# Patient Record
Sex: FEMALE | Race: OTHER | Employment: STUDENT | URBAN - METROPOLITAN AREA
[De-identification: names, ages, dates, MRNs, and addresses within clinical notes are randomized per-mention and may not be internally consistent; named-entity substitution may affect disease eponyms.]

---

## 2017-05-22 ENCOUNTER — APPOINTMENT (OUTPATIENT)
Dept: OCCUPATIONAL MEDICINE | Age: 22
End: 2017-05-22
Attending: EMERGENCY MEDICINE

## 2017-07-24 ENCOUNTER — LAB ENCOUNTER (OUTPATIENT)
Dept: LAB | Age: 22
End: 2017-07-24
Attending: FAMILY MEDICINE
Payer: COMMERCIAL

## 2017-07-24 ENCOUNTER — HOSPITAL ENCOUNTER (OUTPATIENT)
Dept: GENERAL RADIOLOGY | Age: 22
Discharge: HOME | End: 2017-07-24
Attending: FAMILY MEDICINE
Payer: COMMERCIAL

## 2017-07-24 ENCOUNTER — OFFICE VISIT (OUTPATIENT)
Dept: FAMILY MEDICINE CLINIC | Facility: CLINIC | Age: 22
End: 2017-07-24

## 2017-07-24 VITALS
BODY MASS INDEX: 40.33 KG/M2 | DIASTOLIC BLOOD PRESSURE: 68 MMHG | HEIGHT: 65.5 IN | HEART RATE: 77 BPM | WEIGHT: 245 LBS | SYSTOLIC BLOOD PRESSURE: 111 MMHG | TEMPERATURE: 100 F

## 2017-07-24 DIAGNOSIS — R10.33 PERIUMBILICAL ABDOMINAL PAIN: ICD-10-CM

## 2017-07-24 DIAGNOSIS — R10.33 PERIUMBILICAL ABDOMINAL PAIN: Primary | ICD-10-CM

## 2017-07-24 LAB
ALBUMIN SERPL BCP-MCNC: 3.8 G/DL (ref 3.5–4.8)
ALBUMIN/GLOB SERPL: 1.1 {RATIO} (ref 1–2)
ALP SERPL-CCNC: 56 U/L (ref 32–100)
ALT SERPL-CCNC: 16 U/L (ref 14–54)
ANION GAP SERPL CALC-SCNC: 6 MMOL/L (ref 0–18)
AST SERPL-CCNC: 18 U/L (ref 15–41)
BASOPHILS # BLD: 0.1 K/UL (ref 0–0.2)
BASOPHILS NFR BLD: 1 %
BILIRUB SERPL-MCNC: 0.4 MG/DL (ref 0.3–1.2)
BUN SERPL-MCNC: 6 MG/DL (ref 8–20)
BUN/CREAT SERPL: 17.1 (ref 10–20)
CALCIUM SERPL-MCNC: 9.2 MG/DL (ref 8.5–10.5)
CHLORIDE SERPL-SCNC: 105 MMOL/L (ref 95–110)
CO2 SERPL-SCNC: 26 MMOL/L (ref 22–32)
CREAT SERPL-MCNC: 0.35 MG/DL (ref 0.5–1.5)
EOSINOPHIL # BLD: 0.2 K/UL (ref 0–0.7)
EOSINOPHIL NFR BLD: 2 %
ERYTHROCYTE [DISTWIDTH] IN BLOOD BY AUTOMATED COUNT: 13 % (ref 11–15)
ERYTHROCYTE [SEDIMENTATION RATE] IN BLOOD: 15 MM/HR (ref 0–20)
GLOBULIN PLAS-MCNC: 3.4 G/DL (ref 2.5–3.7)
GLUCOSE SERPL-MCNC: 90 MG/DL (ref 70–99)
HCT VFR BLD AUTO: 36.1 % (ref 35–48)
HGB BLD-MCNC: 12.5 G/DL (ref 12–16)
LYMPHOCYTES # BLD: 2.4 K/UL (ref 1–4)
LYMPHOCYTES NFR BLD: 24 %
MCH RBC QN AUTO: 30.5 PG (ref 27–32)
MCHC RBC AUTO-ENTMCNC: 34.6 G/DL (ref 32–37)
MCV RBC AUTO: 88.1 FL (ref 80–100)
MONOCYTES # BLD: 0.7 K/UL (ref 0–1)
MONOCYTES NFR BLD: 7 %
NEUTROPHILS # BLD AUTO: 6.6 K/UL (ref 1.8–7.7)
NEUTROPHILS NFR BLD: 66 %
OSMOLALITY UR CALC.SUM OF ELEC: 281 MOSM/KG (ref 275–295)
PLATELET # BLD AUTO: 313 K/UL (ref 140–400)
PMV BLD AUTO: 9.6 FL (ref 7.4–10.3)
POTASSIUM SERPL-SCNC: 3.6 MMOL/L (ref 3.3–5.1)
PROT SERPL-MCNC: 7.2 G/DL (ref 5.9–8.4)
RBC # BLD AUTO: 4.1 M/UL (ref 3.7–5.4)
SODIUM SERPL-SCNC: 137 MMOL/L (ref 136–144)
WBC # BLD AUTO: 9.9 K/UL (ref 4–11)

## 2017-07-24 PROCEDURE — 99212 OFFICE O/P EST SF 10 MIN: CPT | Performed by: FAMILY MEDICINE

## 2017-07-24 PROCEDURE — 74000 XR ABDOMEN (KUB) (1 AP VIEW)  (CPT=74000): CPT | Performed by: FAMILY MEDICINE

## 2017-07-24 PROCEDURE — 85652 RBC SED RATE AUTOMATED: CPT

## 2017-07-24 PROCEDURE — 80053 COMPREHEN METABOLIC PANEL: CPT

## 2017-07-24 PROCEDURE — 36415 COLL VENOUS BLD VENIPUNCTURE: CPT

## 2017-07-24 PROCEDURE — 85025 COMPLETE CBC W/AUTO DIFF WBC: CPT

## 2017-07-24 PROCEDURE — 99203 OFFICE O/P NEW LOW 30 MIN: CPT | Performed by: FAMILY MEDICINE

## 2017-07-24 NOTE — PROGRESS NOTES
Patient ID: Ebony Soriano is a 25year old female. HPI  Patient presents with:  Abdominal Pain    She goes to 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    She states 3 weeks a Mucous membranes are normal.   Neck: Normal range of motion. Neck supple. No thyromegaly   Cardiovascular: Normal rate, regular rhythm and normal heart sounds. Pulmonary/Chest: Effort normal and breath sounds normal. No respiratory distress.    Abdominal

## 2017-07-26 ENCOUNTER — TELEPHONE (OUTPATIENT)
Dept: FAMILY MEDICINE CLINIC | Facility: CLINIC | Age: 22
End: 2017-07-26

## 2017-07-26 NOTE — TELEPHONE ENCOUNTER
----- Message from Zara Alberto DO sent at 7/24/2017  1:18 PM CDT -----  X-ray of the abdomen is normal.  IUD is in place.

## 2017-08-08 ENCOUNTER — TELEPHONE (OUTPATIENT)
Dept: FAMILY MEDICINE CLINIC | Facility: CLINIC | Age: 22
End: 2017-08-08

## 2017-08-08 ENCOUNTER — OFFICE VISIT (OUTPATIENT)
Dept: FAMILY MEDICINE CLINIC | Facility: CLINIC | Age: 22
End: 2017-08-08

## 2017-08-08 VITALS
BODY MASS INDEX: 41.15 KG/M2 | WEIGHT: 250 LBS | DIASTOLIC BLOOD PRESSURE: 72 MMHG | SYSTOLIC BLOOD PRESSURE: 119 MMHG | TEMPERATURE: 99 F | HEIGHT: 65.5 IN | HEART RATE: 83 BPM

## 2017-08-08 DIAGNOSIS — L25.5 CONTACT DERMATITIS DUE TO PLANT: Primary | ICD-10-CM

## 2017-08-08 PROCEDURE — 99212 OFFICE O/P EST SF 10 MIN: CPT | Performed by: FAMILY MEDICINE

## 2017-08-08 PROCEDURE — 99213 OFFICE O/P EST LOW 20 MIN: CPT | Performed by: FAMILY MEDICINE

## 2017-08-08 RX ORDER — PREDNISONE 20 MG/1
TABLET ORAL
Qty: 18 TABLET | Refills: 0 | Status: SHIPPED | OUTPATIENT
Start: 2017-08-08 | End: 2018-06-07

## 2017-08-08 RX ORDER — HYDROXYZINE HYDROCHLORIDE 10 MG/1
10 TABLET, FILM COATED ORAL EVERY 8 HOURS PRN
Qty: 30 TABLET | Refills: 0 | Status: SHIPPED | OUTPATIENT
Start: 2017-08-08 | End: 2017-08-18

## 2017-08-08 NOTE — TELEPHONE ENCOUNTER
Actions Requested: Dr. Sadi Hernandez, are you able to add on patient today?  After 2:30pm?  Problem: RT hand blister like rash  Onset and Timing: Thursday  Associated Symptoms: red, swelling started today  Aggravating by: sensitive to the touch  Alleviated by:   Deny Ambrocio

## 2017-08-08 NOTE — TELEPHONE ENCOUNTER
Contacted Dr. Brooke Lopez via [employer] . Approved to add patient on asap. Patient will come right after work (2:30pm) not far from [facility] clinic.   appt made for 2:50pm

## 2017-08-08 NOTE — PROGRESS NOTES
Patient ID: Sergio Edwards is a 25year old female. Actions Requested: Dr. Rose Nelson, are you able to add on patient today?  After 2:30pm?  Problem: RT hand blister like rash  Onset and Timing: Thursday  Associated Symptoms: red, swelling started today  Brandie Maldonado by mouth at same time for 3 days then 1 tab each day for 3 days. -     HydrOXYzine HCl 10 MG Oral Tab; Take 1 tablet (10 mg total) by mouth every 8 (eight) hours as needed for Itching (10). This medicine can make you tired.   -     triamcinolone acetonide

## 2018-06-07 ENCOUNTER — LAB ENCOUNTER (OUTPATIENT)
Dept: LAB | Age: 23
End: 2018-06-07
Attending: NURSE PRACTITIONER
Payer: COMMERCIAL

## 2018-06-07 ENCOUNTER — OFFICE VISIT (OUTPATIENT)
Dept: FAMILY MEDICINE CLINIC | Facility: CLINIC | Age: 23
End: 2018-06-07

## 2018-06-07 VITALS
WEIGHT: 247 LBS | BODY MASS INDEX: 40.66 KG/M2 | SYSTOLIC BLOOD PRESSURE: 131 MMHG | HEIGHT: 65.5 IN | HEART RATE: 90 BPM | DIASTOLIC BLOOD PRESSURE: 85 MMHG

## 2018-06-07 DIAGNOSIS — Z20.2 EXPOSURE TO SEXUALLY TRANSMITTED DISEASE (STD): Primary | ICD-10-CM

## 2018-06-07 DIAGNOSIS — Z20.2 EXPOSURE TO SEXUALLY TRANSMITTED DISEASE (STD): ICD-10-CM

## 2018-06-07 PROCEDURE — 86780 TREPONEMA PALLIDUM: CPT

## 2018-06-07 PROCEDURE — 86704 HEP B CORE ANTIBODY TOTAL: CPT

## 2018-06-07 PROCEDURE — 86706 HEP B SURFACE ANTIBODY: CPT

## 2018-06-07 PROCEDURE — 80500 HEPATITIS A B + C PROFILE: CPT

## 2018-06-07 PROCEDURE — 86803 HEPATITIS C AB TEST: CPT

## 2018-06-07 PROCEDURE — 87491 CHLMYD TRACH DNA AMP PROBE: CPT

## 2018-06-07 PROCEDURE — 86709 HEPATITIS A IGM ANTIBODY: CPT

## 2018-06-07 PROCEDURE — 87389 HIV-1 AG W/HIV-1&-2 AB AG IA: CPT

## 2018-06-07 PROCEDURE — 86694 HERPES SIMPLEX NES ANTBDY: CPT

## 2018-06-07 PROCEDURE — 99213 OFFICE O/P EST LOW 20 MIN: CPT | Performed by: NURSE PRACTITIONER

## 2018-06-07 PROCEDURE — 87591 N.GONORRHOEAE DNA AMP PROB: CPT

## 2018-06-07 PROCEDURE — 36415 COLL VENOUS BLD VENIPUNCTURE: CPT

## 2018-06-07 PROCEDURE — 86695 HERPES SIMPLEX TYPE 1 TEST: CPT

## 2018-06-07 PROCEDURE — 87340 HEPATITIS B SURFACE AG IA: CPT

## 2018-06-07 PROCEDURE — 86708 HEPATITIS A ANTIBODY: CPT

## 2018-06-07 PROCEDURE — 86696 HERPES SIMPLEX TYPE 2 TEST: CPT

## 2018-06-07 RX ORDER — AZITHROMYCIN 500 MG/1
TABLET, FILM COATED ORAL
Qty: 2 TABLET | Refills: 0 | Status: SHIPPED | OUTPATIENT
Start: 2018-06-07 | End: 2021-01-25

## 2018-06-07 NOTE — PATIENT INSTRUCTIONS
What Are Sexually Transmitted Diseases (STDs)? A sexually transmitted disease (STD) is a disease that is spread during sex.  (An STD can also be called STI for sexually transmitted infection.) You can become infected with an STD if you have sex with some The only sure way to know if you have an STD is to get checked by a healthcare provider. If you notice a change in how your body looks or feels, have it checked out. But keep in mind, STDs don’t always show symptoms.  So if you’re at risk of STDs, get check

## 2018-06-07 NOTE — PROGRESS NOTES
HPI  Pt here for std testing. Partner from 2 months tested positive for chlamydia. Has new partner. Pt has female partners. Never had a pap-req to follow up at her gyne for pap. Would like all std testing done.     Denies any s/s assoc with chlamydia in and regular rhythm. Pulmonary/Chest: Effort normal. No respiratory distress. Abdominal: Soft. She exhibits no distension. There is no tenderness. Musculoskeletal: Normal range of motion. Lymphadenopathy:     She has no cervical adenopathy.    Neuro

## 2018-06-11 ENCOUNTER — TELEPHONE (OUTPATIENT)
Dept: FAMILY MEDICINE CLINIC | Facility: CLINIC | Age: 23
End: 2018-06-11

## 2018-06-12 ENCOUNTER — TELEPHONE (OUTPATIENT)
Dept: OTHER | Age: 23
End: 2018-06-12

## 2018-06-12 NOTE — TELEPHONE ENCOUNTER
----- Message from RICHARD Herrera FNP-C sent at 6/11/2018  8:06 PM CDT -----  Your chlamydia and gonorrhea screening is negative. Your syphillis screening is negative.   Your HIV is negative  Your hepatitis screen shows immunity against Hepatitis

## 2019-10-17 NOTE — TELEPHONE ENCOUNTER
Patient calling - verified patient's name and  - informed patient of doctor's note - patient verbalized understanding and had no further questions. Universal Safety Interventions

## 2020-02-18 ENCOUNTER — TELEPHONE (OUTPATIENT)
Dept: MATERNAL FETAL MEDICINE | Age: 25
End: 2020-02-18

## 2020-02-18 DIAGNOSIS — O36.5990 IUGR (INTRAUTERINE GROWTH RESTRICTION) AFFECTING CARE OF MOTHER: Primary | ICD-10-CM

## 2020-02-19 ENCOUNTER — DOCUMENTATION (OUTPATIENT)
Dept: MATERNAL FETAL MEDICINE | Age: 25
End: 2020-02-19

## 2020-02-24 ENCOUNTER — OFFICE VISIT (OUTPATIENT)
Dept: MATERNAL FETAL MEDICINE | Age: 25
End: 2020-02-24

## 2020-02-24 DIAGNOSIS — O36.5930 INTRAUTERINE GROWTH RESTRICTION (IUGR) AFFECTING CARE OF MOTHER, THIRD TRIMESTER, SINGLE OR UNSPECIFIED FETUS: ICD-10-CM

## 2020-02-24 DIAGNOSIS — O36.5990 IUGR (INTRAUTERINE GROWTH RESTRICTION) AFFECTING CARE OF MOTHER: Primary | ICD-10-CM

## 2020-02-24 DIAGNOSIS — Z3A.37 37 WEEKS GESTATION OF PREGNANCY: ICD-10-CM

## 2020-02-24 DIAGNOSIS — Z3A.37 37 WEEKS GESTATION OF PREGNANCY: Primary | ICD-10-CM

## 2020-02-24 DIAGNOSIS — Z36.89 ENCOUNTER FOR ULTRASOUND TO ASSESS INTERVAL GROWTH OF FETUS: ICD-10-CM

## 2020-02-24 LAB
APPEARANCE, POC: CLEAR
BILIRUBIN, POC: NEGATIVE
COLOR, POC: YELLOW
GLUCOSE UR-MCNC: NEGATIVE MG/DL
KETONES, POC: NEGATIVE
NITRITE, POC: NEGATIVE
OCCULT BLOOD, POC: NEGATIVE
PH UR: 7.5 [PH] (ref 5–7)
PROT UR-MCNC: NEGATIVE G/DL
SP GR UR: 1.02 (ref 1–1.03)
UROBILINOGEN UR-MCNC: 1 MG/DL (ref 0–1)
WBC (LEUKOCYTE) ESTERASE, POC: NEGATIVE

## 2020-02-24 PROCEDURE — 81002 URINALYSIS NONAUTO W/O SCOPE: CPT | Performed by: OBSTETRICS & GYNECOLOGY

## 2020-02-24 PROCEDURE — 99243 OFF/OP CNSLTJ NEW/EST LOW 30: CPT | Performed by: OBSTETRICS & GYNECOLOGY

## 2020-02-24 PROCEDURE — 76816 OB US FOLLOW-UP PER FETUS: CPT | Performed by: OBSTETRICS & GYNECOLOGY

## 2020-02-24 PROCEDURE — 76820 UMBILICAL ARTERY ECHO: CPT | Performed by: OBSTETRICS & GYNECOLOGY

## 2020-02-24 SDOH — SOCIAL STABILITY: SOCIAL INSECURITY: WITHIN THE LAST YEAR, HAVE YOU BEEN HUMILIATED OR EMOTIONALLY ABUSED IN OTHER WAYS BY YOUR PARTNER OR EX-PARTNER?: NO

## 2020-02-24 SDOH — SOCIAL STABILITY: SOCIAL INSECURITY
WITHIN THE LAST YEAR, HAVE TO BEEN RAPED OR FORCED TO HAVE ANY KIND OF SEXUAL ACTIVITY BY YOUR PARTNER OR EX-PARTNER?: NO

## 2020-02-24 SDOH — HEALTH STABILITY: PHYSICAL HEALTH: ON AVERAGE, HOW MANY DAYS PER WEEK DO YOU ENGAGE IN MODERATE TO STRENUOUS EXERCISE (LIKE A BRISK WALK)?: 0 DAYS

## 2020-02-24 SDOH — HEALTH STABILITY: MENTAL HEALTH
STRESS IS WHEN SOMEONE FEELS TENSE, NERVOUS, ANXIOUS, OR CAN'T SLEEP AT NIGHT BECAUSE THEIR MIND IS TROUBLED. HOW STRESSED ARE YOU?: NOT AT ALL

## 2020-02-24 SDOH — HEALTH STABILITY: PHYSICAL HEALTH: ON AVERAGE, HOW MANY MINUTES DO YOU ENGAGE IN EXERCISE AT THIS LEVEL?: 0 MIN

## 2020-02-24 SDOH — ECONOMIC STABILITY: TRANSPORTATION INSECURITY
IN THE PAST 12 MONTHS, HAS LACK OF TRANSPORTATION KEPT YOU FROM MEETINGS, WORK, OR FROM GETTING THINGS NEEDED FOR DAILY LIVING?: NO

## 2020-02-24 SDOH — ECONOMIC STABILITY: TRANSPORTATION INSECURITY
IN THE PAST 12 MONTHS, HAS THE LACK OF TRANSPORTATION KEPT YOU FROM MEDICAL APPOINTMENTS OR FROM GETTING MEDICATIONS?: NO

## 2020-02-24 SDOH — SOCIAL STABILITY: SOCIAL INSECURITY: WITHIN THE LAST YEAR, HAVE YOU BEEN AFRAID OF YOUR PARTNER OR EX-PARTNER?: NO

## 2020-02-24 SDOH — SOCIAL STABILITY: SOCIAL INSECURITY
WITHIN THE LAST YEAR, HAVE YOU BEEN KICKED, HIT, SLAPPED, OR OTHERWISE PHYSICALLY HURT BY YOUR PARTNER OR EX-PARTNER?: NO

## 2020-02-24 ASSESSMENT — PAIN SCALES - GENERAL: PAINLEVEL: 0

## 2020-03-05 ENCOUNTER — HOSPITAL (OUTPATIENT)
Dept: OTHER | Age: 25
End: 2020-03-05
Attending: OBSTETRICS & GYNECOLOGY

## 2020-03-05 LAB
ABO + RH BLD: NORMAL
ABO + RH BLD: NORMAL
ANALYZER ANC (IANC): ABNORMAL
BASE DEFICIT BLDCOA-SCNC: 5 MMOL/L
BASE DEFICIT BLDCOV-SCNC: 5 MMOL/L
BASE EXCESS BLDCOA CALC-SCNC: NORMAL MMOL/L
BASE EXCESS-RC: ABNORMAL
BLD GP AB SCN SERPL QL: NEGATIVE
CROSSMATCH EXPIRE: NORMAL
ERYTHROCYTE [DISTWIDTH] IN BLOOD: 15.5 % (ref 11–15)
HCO3 BLDCOA-SCNC: 22 MMOL/L (ref 21–28)
HCO3 BLDCOV-SCNC: 21 MMOL/L (ref 22–29)
HCT VFR BLD CALC: 31.4 % (ref 36–46.5)
HGB BLD-MCNC: 9.7 G/DL (ref 12–15.5)
MCH RBC QN AUTO: 24.4 PG (ref 26–34)
MCHC RBC AUTO-ENTMCNC: 30.9 G/DL (ref 32–36.5)
MCV RBC AUTO: 78.9 FL (ref 78–100)
NRBC (NRBCRE): 0 /100 WBC
PCO2 BLDCOA: 49 MM HG (ref 31–74)
PCO2 BLDCOV: 42 MM HG (ref 23–49)
PH BLDCOA: 7.26 UNITS (ref 7.18–7.38)
PH BLDCOV: 7.31 UNITS (ref 7.25–7.45)
PLATELET # BLD: 249 K/MCL (ref 140–450)
PO2 BLDCOV: 24 MM HG (ref 17–41)
PO2 RC ARTERIAL CORD (RACPO): 24 MM HG (ref 6–31)
RBC # BLD: 3.98 MIL/MCL (ref 4–5.2)
WBC # BLD: 8 K/MCL (ref 4.2–11)

## 2020-03-06 LAB
ANALYZER ANC (IANC): ABNORMAL
ERYTHROCYTE [DISTWIDTH] IN BLOOD: 15.7 % (ref 11–15)
HCT VFR BLD CALC: 24.5 % (ref 36–46.5)
HGB BLD-MCNC: 7.6 G/DL (ref 12–15.5)
MCH RBC QN AUTO: 24.6 PG (ref 26–34)
MCHC RBC AUTO-ENTMCNC: 31 G/DL (ref 32–36.5)
MCV RBC AUTO: 79.3 FL (ref 78–100)
NRBC (NRBCRE): 0 /100 WBC
PLATELET # BLD: 224 K/MCL (ref 140–450)
RBC # BLD: 3.09 MIL/MCL (ref 4–5.2)
WBC # BLD: 10.9 K/MCL (ref 4.2–11)

## 2020-03-11 LAB
PATHOLOGIST NAME: NORMAL
PATHOLOGIST NAME: NORMAL

## 2020-03-24 ENCOUNTER — HOSPITAL ENCOUNTER (EMERGENCY)
Age: 25
Discharge: HOME | End: 2020-03-24

## 2020-03-24 PROCEDURE — 10003627 HB COUNTER ED NO SERVICE

## 2020-03-24 PROCEDURE — 87635 SARS-COV-2 COVID-19 AMP PRB: CPT

## 2020-03-25 LAB
SARS-COV-2 RNA SPEC QL NAA+PROBE: NOT DETECTED
SPECIMEN SOURCE: NORMAL

## 2020-04-08 ENCOUNTER — TELEPHONE (OUTPATIENT)
Dept: TELEHEALTH | Age: 25
End: 2020-04-08

## 2020-06-23 DIAGNOSIS — Z03.818 ENCOUNTER FOR OBSERVATION FOR SUSPECTED EXPOSURE TO OTHER BIOLOGICAL AGENTS RULED OUT: Primary | ICD-10-CM

## 2020-06-25 ENCOUNTER — LAB SERVICES (OUTPATIENT)
Dept: LAB | Age: 25
End: 2020-06-25

## 2020-06-25 DIAGNOSIS — Z03.818 ENCOUNTER FOR OBSERVATION FOR SUSPECTED EXPOSURE TO OTHER BIOLOGICAL AGENTS RULED OUT: ICD-10-CM

## 2020-06-25 PROCEDURE — 36415 COLL VENOUS BLD VENIPUNCTURE: CPT | Performed by: INTERNAL MEDICINE

## 2020-06-25 PROCEDURE — 86769 SARS-COV-2 COVID-19 ANTIBODY: CPT | Performed by: INTERNAL MEDICINE

## 2020-06-26 LAB
SARS-COV-2 IGG SERPL QL IA: NEGATIVE
SARS-COV-2 N IGG SERPL QL IA: 0.06

## 2020-08-08 ENCOUNTER — EMPLOYEE HEALTH (OUTPATIENT)
Dept: OTHER | Age: 25
End: 2020-08-08

## 2020-08-09 ENCOUNTER — EMPLOYEE HEALTH (OUTPATIENT)
Dept: OTHER | Age: 25
End: 2020-08-09

## 2020-12-08 ENCOUNTER — APPOINTMENT (OUTPATIENT)
Dept: CT IMAGING | Age: 25
End: 2020-12-08
Attending: NURSE PRACTITIONER

## 2020-12-08 ENCOUNTER — HOSPITAL ENCOUNTER (EMERGENCY)
Age: 25
Discharge: HOME | End: 2020-12-08
Attending: EMERGENCY MEDICINE

## 2020-12-08 ENCOUNTER — APPOINTMENT (OUTPATIENT)
Dept: GENERAL RADIOLOGY | Age: 25
End: 2020-12-08
Attending: NURSE PRACTITIONER

## 2020-12-08 VITALS
HEART RATE: 80 BPM | HEIGHT: 65 IN | WEIGHT: 260 LBS | TEMPERATURE: 98.2 F | SYSTOLIC BLOOD PRESSURE: 135 MMHG | OXYGEN SATURATION: 98 % | BODY MASS INDEX: 43.32 KG/M2 | RESPIRATION RATE: 16 BRPM | DIASTOLIC BLOOD PRESSURE: 73 MMHG

## 2020-12-08 DIAGNOSIS — S80.12XA CONTUSION OF LEFT LOWER EXTREMITY, INITIAL ENCOUNTER: ICD-10-CM
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
## 2020-12-08 DIAGNOSIS — S09.90XA CLOSED HEAD INJURY, INITIAL ENCOUNTER: ICD-10-CM

## 2020-12-08 LAB
HCG UR QL: NEGATIVE
INTERNAL PROCEDURAL CONTROLS ACCEPTABLE: NO

## 2020-12-08 PROCEDURE — 81025 URINE PREGNANCY TEST: CPT | Performed by: NURSE PRACTITIONER

## 2020-12-08 PROCEDURE — 73590 X-RAY EXAM OF LOWER LEG: CPT

## 2020-12-08 PROCEDURE — 70450 CT HEAD/BRAIN W/O DYE: CPT

## 2020-12-08 PROCEDURE — 84703 CHORIONIC GONADOTROPIN ASSAY: CPT

## 2020-12-08 PROCEDURE — 99284 EMERGENCY DEPT VISIT MOD MDM: CPT

## 2020-12-08 PROCEDURE — 10004651 HB RX, NO CHARGE ITEM: Performed by: NURSE PRACTITIONER

## 2020-12-08 RX ORDER — ACETAMINOPHEN 325 MG/1
650 TABLET ORAL ONCE
Status: COMPLETED | OUTPATIENT
Start: 2020-12-08 | End: 2020-12-08

## 2020-12-08 RX ADMIN — ACETAMINOPHEN 650 MG: 325 TABLET ORAL at 19:50

## 2020-12-08 ASSESSMENT — ENCOUNTER SYMPTOMS
PSYCHIATRIC NEGATIVE: 1
BACK PAIN: 0
DIARRHEA: 0
SHORTNESS OF BREATH: 0
ENDOCRINE NEGATIVE: 1
NAUSEA: 1
HEADACHES: 1
EYES NEGATIVE: 1
HEMATOLOGIC/LYMPHATIC NEGATIVE: 1
COUGH: 0
WEAKNESS: 0
FEVER: 0
DIZZINESS: 0
ABDOMINAL PAIN: 0

## 2020-12-08 ASSESSMENT — PAIN SCALES - GENERAL: PAINLEVEL_OUTOF10: 6

## 2021-01-15 LAB — CYTOLOGY CVX/VAG DOC THIN PREP: NORMAL

## 2021-01-25 ENCOUNTER — HOSPITAL ENCOUNTER (OUTPATIENT)
Age: 26
Discharge: HOME | End: 2021-01-25
Payer: COMMERCIAL

## 2021-01-25 VITALS
DIASTOLIC BLOOD PRESSURE: 70 MMHG | RESPIRATION RATE: 18 BRPM | HEART RATE: 74 BPM | HEIGHT: 66 IN | SYSTOLIC BLOOD PRESSURE: 142 MMHG | WEIGHT: 255 LBS | BODY MASS INDEX: 40.98 KG/M2 | OXYGEN SATURATION: 99 % | TEMPERATURE: 97 F

## 2021-01-25 DIAGNOSIS — J02.9 SORE THROAT: ICD-10-CM

## 2021-01-25 DIAGNOSIS — Z20.822 ENCOUNTER FOR LABORATORY TESTING FOR COVID-19 VIRUS: Primary | ICD-10-CM

## 2021-01-25 DIAGNOSIS — H93.8X1 CONGESTION OF RIGHT EAR: ICD-10-CM

## 2021-01-25 LAB
S PYO AG THROAT QL: NEGATIVE
SARS-COV-2 RNA RESP QL NAA+PROBE: NOT DETECTED

## 2021-01-25 PROCEDURE — 87880 STREP A ASSAY W/OPTIC: CPT | Performed by: EMERGENCY MEDICINE

## 2021-01-25 PROCEDURE — 99213 OFFICE O/P EST LOW 20 MIN: CPT | Performed by: EMERGENCY MEDICINE

## 2021-01-25 NOTE — ED PROVIDER NOTES
Patient Seen in: [facility]      History   Patient presents with:  Sore Throat    Stated Complaint: Throat/ear ache    HPI/Subjective:   George Howard is a 26year old  female here for sore throat that started 1 day ago.   Patient is also expe and external ear normal.  No middle ear effusion. Tympanic membrane is not injected or erythematous. Tympanic membrane has normal mobility. Nose: No congestion.       Mouth/Throat:      Mouth: Mucous membranes are moist.      Pharynx: No oropharyngeal antihistamines. No hx of  immunocompromise. Nontoxic appearance. Patient euvolemic with no trismus. No airway compromise. Able to tolerate PO.  ehr reviewed.   Given History and Exam I have low suspicion for this presentation being caused by PTA, RPA, Shree

## 2021-04-09 DIAGNOSIS — Z23 NEED FOR VACCINATION: ICD-10-CM

## 2021-04-13 ENCOUNTER — OFFICE VISIT (OUTPATIENT)
Dept: FAMILY MEDICINE | Age: 26
End: 2021-04-13

## 2021-04-13 VITALS
RESPIRATION RATE: 16 BRPM | TEMPERATURE: 98.5 F | DIASTOLIC BLOOD PRESSURE: 87 MMHG | WEIGHT: 256.84 LBS | BODY MASS INDEX: 42.79 KG/M2 | HEART RATE: 74 BPM | HEIGHT: 65 IN | OXYGEN SATURATION: 98 % | SYSTOLIC BLOOD PRESSURE: 134 MMHG

## 2021-04-13 DIAGNOSIS — F51.02 ADJUSTMENT INSOMNIA: ICD-10-CM

## 2021-04-13 DIAGNOSIS — Z00.00 ANNUAL PHYSICAL EXAM: Primary | ICD-10-CM

## 2021-04-13 DIAGNOSIS — Z82.0 FAMILY HISTORY OF HUNTINGTON'S DISEASE: ICD-10-CM

## 2021-04-13 DIAGNOSIS — E66.01 MORBID OBESITY WITH BMI OF 40.0-44.9, ADULT (CMD): ICD-10-CM

## 2021-04-13 PROCEDURE — 99385 PREV VISIT NEW AGE 18-39: CPT | Performed by: FAMILY MEDICINE

## 2021-04-13 RX ORDER — VITAMIN A ACETATE, BETA CAROTENE, ASCORBIC ACID, CHOLECALCIFEROL, .ALPHA.-TOCOPHEROL ACETATE, DL-, THIAMINE MONONITRATE, RIBOFLAVIN, NIACINAMIDE, PYRIDOXINE HYDROCHLORIDE, FOLIC ACID, CYANOCOBALAMIN, CALCIUM CARBONATE, FERROUS FUMARATE, ZINC OXIDE, CUPRIC OXIDE 3080; 12; 120; 400; 1; 1.84; 3; 20; 22; 920; 25; 200; 27; 10; 2 [IU]/1; UG/1; MG/1; [IU]/1; MG/1; MG/1; MG/1; MG/1; MG/1; [IU]/1; MG/1; MG/1; MG/1; MG/1; MG/1
1 TABLET, FILM COATED ORAL DAILY
COMMUNITY

## 2021-04-13 RX ORDER — MELATONIN 5 MG
TABLET,CHEWABLE ORAL
COMMUNITY

## 2021-04-13 ASSESSMENT — ENCOUNTER SYMPTOMS
TROUBLE SWALLOWING: 0
CHILLS: 0
COUGH: 0
CHEST TIGHTNESS: 0
HEADACHES: 0
CONSTIPATION: 0
PHOTOPHOBIA: 0
VOMITING: 0
ENDOCRINE NEGATIVE: 1
FEVER: 0
HEMATOLOGIC/LYMPHATIC NEGATIVE: 1
SLEEP DISTURBANCE: 1
SHORTNESS OF BREATH: 0
NAUSEA: 0
DIZZINESS: 0
ABDOMINAL PAIN: 0
WHEEZING: 0
EYE DISCHARGE: 0
FATIGUE: 0
DIARRHEA: 0
SORE THROAT: 0
BLOOD IN STOOL: 0
WOUND: 0
ALLERGIC/IMMUNOLOGIC NEGATIVE: 1

## 2021-04-13 ASSESSMENT — PATIENT HEALTH QUESTIONNAIRE - PHQ9
CLINICAL INTERPRETATION OF PHQ9 SCORE: NO FURTHER SCREENING NEEDED
SUM OF ALL RESPONSES TO PHQ9 QUESTIONS 1 AND 2: 0
SUM OF ALL RESPONSES TO PHQ9 QUESTIONS 1 AND 2: 0
1. LITTLE INTEREST OR PLEASURE IN DOING THINGS: NOT AT ALL
2. FEELING DOWN, DEPRESSED OR HOPELESS: NOT AT ALL
CLINICAL INTERPRETATION OF PHQ2 SCORE: NO FURTHER SCREENING NEEDED

## 2021-04-16 ENCOUNTER — LAB SERVICES (OUTPATIENT)
Dept: FAMILY MEDICINE | Age: 26
End: 2021-04-16

## 2021-04-16 ENCOUNTER — APPOINTMENT (OUTPATIENT)
Dept: LAB | Age: 26
End: 2021-04-16

## 2021-04-16 DIAGNOSIS — Z00.00 ANNUAL PHYSICAL EXAM: ICD-10-CM

## 2021-04-16 PROCEDURE — 80061 LIPID PANEL: CPT | Performed by: FAMILY MEDICINE

## 2021-04-16 PROCEDURE — 80050 GENERAL HEALTH PANEL: CPT | Performed by: FAMILY MEDICINE

## 2021-04-16 PROCEDURE — 83036 HEMOGLOBIN GLYCOSYLATED A1C: CPT | Performed by: FAMILY MEDICINE

## 2021-04-16 PROCEDURE — 36415 COLL VENOUS BLD VENIPUNCTURE: CPT

## 2021-04-17 LAB
ALBUMIN SERPL-MCNC: 3.7 G/DL (ref 3.6–5.1)
ALBUMIN/GLOB SERPL: 1 {RATIO} (ref 1–2.4)
ALP SERPL-CCNC: 70 UNITS/L (ref 45–117)
ALT SERPL-CCNC: 26 UNITS/L
ANION GAP SERPL CALC-SCNC: 10 MMOL/L (ref 10–20)
AST SERPL-CCNC: 20 UNITS/L
BASOPHILS # BLD: 0 K/MCL (ref 0–0.3)
BASOPHILS NFR BLD: 1 %
BILIRUB SERPL-MCNC: 0.4 MG/DL (ref 0.2–1)
BUN SERPL-MCNC: 10 MG/DL (ref 6–20)
BUN/CREAT SERPL: 20 (ref 7–25)
CALCIUM SERPL-MCNC: 9.5 MG/DL (ref 8.4–10.2)
CHLORIDE SERPL-SCNC: 106 MMOL/L (ref 98–107)
CHOLEST SERPL-MCNC: 175 MG/DL
CHOLEST/HDLC SERPL: 3.7 {RATIO}
CO2 SERPL-SCNC: 26 MMOL/L (ref 21–32)
CREAT SERPL-MCNC: 0.49 MG/DL (ref 0.51–0.95)
DEPRECATED RDW RBC: 42 FL (ref 39–50)
EOSINOPHIL # BLD: 0.2 K/MCL (ref 0–0.5)
EOSINOPHIL NFR BLD: 2 %
ERYTHROCYTE [DISTWIDTH] IN BLOOD: 12.8 % (ref 11–15)
FASTING DURATION TIME PATIENT: 0 HOURS
FASTING DURATION TIME PATIENT: ABNORMAL H
GFR SERPLBLD BASED ON 1.73 SQ M-ARVRAT: >90 ML/MIN/1.73M2
GLOBULIN SER-MCNC: 3.7 G/DL (ref 2–4)
GLUCOSE SERPL-MCNC: 83 MG/DL (ref 65–99)
HBA1C MFR BLD: 5.1 % (ref 4.5–5.6)
HCT VFR BLD CALC: 38.9 % (ref 36–46.5)
HDLC SERPL-MCNC: 47 MG/DL
HGB BLD-MCNC: 12.6 G/DL (ref 12–15.5)
IMM GRANULOCYTES # BLD AUTO: 0 K/MCL (ref 0–0.2)
IMM GRANULOCYTES # BLD: 0 %
LDLC SERPL CALC-MCNC: 93 MG/DL
LYMPHOCYTES # BLD: 2.3 K/MCL (ref 1–4.8)
LYMPHOCYTES NFR BLD: 27 %
MCH RBC QN AUTO: 28.6 PG (ref 26–34)
MCHC RBC AUTO-ENTMCNC: 32.4 G/DL (ref 32–36.5)
MCV RBC AUTO: 88.4 FL (ref 78–100)
MONOCYTES # BLD: 0.6 K/MCL (ref 0.3–0.9)
MONOCYTES NFR BLD: 8 %
NEUTROPHILS # BLD: 5.4 K/MCL (ref 1.8–7.7)
NEUTROPHILS NFR BLD: 62 %
NONHDLC SERPL-MCNC: 128 MG/DL
NRBC BLD MANUAL-RTO: 0 /100 WBC
PLATELET # BLD AUTO: 350 K/MCL (ref 140–450)
POTASSIUM SERPL-SCNC: 4 MMOL/L (ref 3.4–5.1)
PROT SERPL-MCNC: 7.4 G/DL (ref 6.4–8.2)
RBC # BLD: 4.4 MIL/MCL (ref 4–5.2)
SODIUM SERPL-SCNC: 138 MMOL/L (ref 135–145)
TRIGL SERPL-MCNC: 173 MG/DL
TSH SERPL-ACNC: 1.15 MCUNITS/ML (ref 0.35–5)
WBC # BLD: 8.6 K/MCL (ref 4.2–11)

## 2021-04-28 ENCOUNTER — IMMUNIZATION (OUTPATIENT)
Dept: LAB | Age: 26
End: 2021-04-28

## 2021-04-28 DIAGNOSIS — Z23 NEED FOR VACCINATION: Primary | ICD-10-CM

## 2021-04-28 PROCEDURE — 91301 COVID-19 MODERNA VACCINE: CPT

## 2021-04-28 PROCEDURE — 0011A COVID-19 MODERNA VACCINE: CPT

## 2021-05-27 ENCOUNTER — IMMUNIZATION (OUTPATIENT)
Dept: LAB | Age: 26
End: 2021-05-27
Attending: NURSE PRACTITIONER

## 2021-05-27 DIAGNOSIS — Z23 NEED FOR VACCINATION: Primary | ICD-10-CM

## 2021-05-27 PROCEDURE — 0012A COVID-19 MODERNA VACCINE: CPT | Performed by: NURSE PRACTITIONER

## 2021-05-27 PROCEDURE — 91301 COVID-19 MODERNA VACCINE: CPT | Performed by: NURSE PRACTITIONER

## 2024-01-26 ENCOUNTER — HOSPITAL ENCOUNTER (OUTPATIENT)
Age: 29
Discharge: HOME | End: 2024-01-26
Payer: OTHER GOVERNMENT

## 2024-01-26 VITALS
RESPIRATION RATE: 16 BRPM | TEMPERATURE: 98 F | SYSTOLIC BLOOD PRESSURE: 134 MMHG | DIASTOLIC BLOOD PRESSURE: 56 MMHG | HEART RATE: 106 BPM | OXYGEN SATURATION: 98 %

## 2024-01-26 DIAGNOSIS — U07.1 COVID-19: Primary | ICD-10-CM

## 2024-01-26 PROCEDURE — 99213 OFFICE O/P EST LOW 20 MIN: CPT | Performed by: NURSE PRACTITIONER

## 2024-01-26 NOTE — ED PROVIDER NOTES
Patient Seen in: [facility]      History     Chief Complaint   Patient presents with    Covid     Stated Complaint: Covid Positive    Subjective:   HPI    This is a 28-year-old female presenting for work note.  Patient states she tested positive for COVID on Monday on home test she has been out of work the entire week she just needs a note stating that she was out and a note to return to work next week.  Patient states she has had runny nose cough congestion no chest pain no shortness of breath no nausea vomiting or diarrhea.    Objective:   History reviewed. No pertinent past medical history.           History reviewed. No pertinent surgical history.             Social History     Socioeconomic History    Marital status: Single   Tobacco Use    Smoking status: Former    Smokeless tobacco: Never   Substance and Sexual Activity    Alcohol use: Yes    Drug use: No              Review of Systems    Positive for stated complaint: Covid Positive  Other systems are as noted in HPI.  Constitutional and vital signs reviewed.      All other systems reviewed and negative except as noted above.    Physical Exam     ED Triage Vitals [01/26/24 0810]   /56   Pulse 106   Resp 16   Temp 98 °F (36.7 °C)   Temp src Temporal   SpO2 98 %   O2 Device None (Room air)       Current:/56   Pulse 106   Temp 98 °F (36.7 °C) (Temporal)   Resp 16   SpO2 98%         Physical Exam  Vitals and nursing note reviewed.   Constitutional:       Appearance: Normal appearance.   HENT:      Right Ear: Tympanic membrane normal.      Left Ear: Tympanic membrane normal.      Nose: Congestion present. No rhinorrhea.      Mouth/Throat:      Mouth: Mucous membranes are moist.      Pharynx: Oropharynx is clear. No oropharyngeal exudate or posterior oropharyngeal erythema.   Eyes:      Conjunctiva/sclera: Conjunctivae normal.   Cardiovascular:      Rate and Rhythm: Normal rate.   Pulmonary:      Effort: Pulmonary effort is normal. No  respiratory distress.      Breath sounds: Normal breath sounds. No wheezing.      Comments: + cough  Musculoskeletal:         General: Normal range of motion.      Cervical back: Normal range of motion. No rigidity.   Neurological:      Mental Status: She is alert and oriented to person, place, and time.               ED Course   Labs Reviewed - No data to display                   MDM          Medical Decision Making  28-year-old female well-appearing and nontoxic presenting for work note after testing positive for COVID on Monday.  Discussed supportive therapy per CDC protocol social isolation and that she may return to work on Monday.  All education and instructions placed in discharge paperwork.  Patient acknowledged understanding instructions.    Problems Addressed:  COVID-19: acute illness or injury    Risk  OTC drugs.        Disposition and Plan     Clinical Impression:  1. COVID-19         Disposition:  Discharge  1/26/2024  8:16 am    Follow-up:  Murray Clinton DO  [address] [place] [id number] [phone]      As needed          Medications Prescribed:  There are no discharge medications for this patient.

## 2024-01-26 NOTE — DISCHARGE INSTRUCTIONS
Social isolation  as this is the recommendation of the CDC.  If you develop worsening symptoms such as chest pain shortness of breath nausea vomiting or diarrhea severe headache or high fever go to the emergency department.  For mild symptoms such as body aches chills slight headache low-grade temp take Tylenol.  Hydrate well but do not over hydrate and eat food as tolerated.

## 2024-02-22 ENCOUNTER — HOSPITAL ENCOUNTER (OUTPATIENT)
Age: 29
Discharge: HOME | End: 2024-02-22
Attending: EMERGENCY MEDICINE
Payer: OTHER GOVERNMENT

## 2024-02-22 VITALS
SYSTOLIC BLOOD PRESSURE: 129 MMHG | OXYGEN SATURATION: 99 % | RESPIRATION RATE: 20 BRPM | HEART RATE: 115 BPM | DIASTOLIC BLOOD PRESSURE: 73 MMHG | TEMPERATURE: 99 F

## 2024-02-22 DIAGNOSIS — J10.1 INFLUENZA A: Primary | ICD-10-CM

## 2024-02-22 LAB
POCT INFLUENZA A: POSITIVE
POCT INFLUENZA B: NEGATIVE
SARS-COV-2 RNA RESP QL NAA+PROBE: NOT DETECTED

## 2024-02-22 PROCEDURE — 99213 OFFICE O/P EST LOW 20 MIN: CPT

## 2024-02-22 PROCEDURE — 87502 INFLUENZA DNA AMP PROBE: CPT | Performed by: EMERGENCY MEDICINE

## 2024-02-22 RX ORDER — OSELTAMIVIR PHOSPHATE 75 MG/1
75 CAPSULE ORAL 2 TIMES DAILY
Qty: 10 CAPSULE | Refills: 0 | Status: SHIPPED | OUTPATIENT
Start: 2024-02-22 | End: 2024-02-27

## 2024-02-22 RX ORDER — METFORMIN HYDROCHLORIDE 500 MG/1
TABLET, EXTENDED RELEASE ORAL
COMMUNITY

## 2024-02-22 NOTE — ED PROVIDER NOTES
Patient Seen in: [facility]      History     Chief Complaint   Patient presents with    Nasal Congestion     Stated Complaint: cold symptoms    Subjective:   HPI    The patient is a 28-year-old female, COVID vaccinated, no recent booster, flu vaccinated who presents now with nasal congestion, fatigue, fever, headache.  Patient denies any significant cough.  The patient states the symptoms started yesterday.  The patient's son had influenza last week.    Objective:   History reviewed. No pertinent past medical history.           History reviewed. No pertinent surgical history.             Social History     Socioeconomic History    Marital status: Single   Tobacco Use    Smoking status: Former    Smokeless tobacco: Never   Substance and Sexual Activity    Alcohol use: Yes    Drug use: No              Review of Systems    Positive for stated complaint: cold symptoms  Other systems are as noted in HPI.  Constitutional and vital signs reviewed.      All other systems reviewed and negative except as noted above.    Physical Exam     ED Triage Vitals [02/22/24 1334]   /73   Pulse 115   Resp 20   Temp 99.1 °F (37.3 °C)   Temp src Temporal   SpO2 99 %   O2 Device None (Room air)       Current:/73   Pulse 115   Temp 99.1 °F (37.3 °C) (Temporal)   Resp 20   SpO2 99%         Physical Exam    Constitutional: Well-developed well-nourished in no acute distress  Head: Normocephalic, no swelling or tenderness  Eyes: Nonicteric sclera, no conjunctival injection  ENT: TMs are clear and flat bilaterally.  There is no posterior pharyngeal erythema  Chest: Clear to auscultation, no tenderness  Cardiovascular: Regular rate and rhythm without murmur  Abdomen: Soft, nontender and nondistended  Neurologic: Patient is awake, alert and oriented ×3.  The patient's motor strength is 5 out of 5 and symmetric in the upper and lower extremities bilaterally  Extremities: No focal swelling or tenderness  Skin: No  pallor, no redness or warmth to the touch      ED Course     Labs Reviewed   POCT FLU TEST - Abnormal; Notable for the following components:       Result Value    POCT INFLUENZA A Positive (*)     All other components within normal limits    Narrative:     This assay is a rapid molecular in vitro test utilizing nucleic acid amplification of influenza A and B viral RNA.   RAPID SARS-COV-2 BY PCR - Normal             Patient's negative COVID, positive influenza A was discussed with the patient.  Patient's symptoms started yesterday.  The patient is currently breast-feeding.  She prefers Tamiflu treatment.         MDM      Viral syndrome versus COVID versus influenza                                   Medical Decision Making      Disposition and Plan     Clinical Impression:  1. Influenza A         Disposition:  Discharge  2/22/2024  1:56 pm    Follow-up:  Paulina Selby  [address] [place] [id number] [phone]      As needed          Medications Prescribed:  Current Discharge Medication List        START taking these medications    Details   oseltamivir 75 MG Oral Cap Take 1 capsule (75 mg total) by mouth 2 (two) times daily for 5 days.  Qty: 10 capsule, Refills: 0

## 2024-02-22 NOTE — ED INITIAL ASSESSMENT (HPI)
Patient arrived ambulatory to room c/o symptoms that started yesterday. +nasal congestion +headaches +headaches +fatigue. No cough. +low grade fevers. Easy non labored respirations.

## 2025-07-09 ENCOUNTER — HOSPITAL ENCOUNTER (EMERGENCY)
Facility: HOSPITAL | Age: 30
Discharge: HOME | End: 2025-07-09
Attending: STUDENT IN AN ORGANIZED HEALTH CARE EDUCATION/TRAINING PROGRAM
Payer: OTHER GOVERNMENT

## 2025-07-09 VITALS
SYSTOLIC BLOOD PRESSURE: 110 MMHG | HEART RATE: 89 BPM | RESPIRATION RATE: 19 BRPM | DIASTOLIC BLOOD PRESSURE: 74 MMHG | OXYGEN SATURATION: 99 % | TEMPERATURE: 98 F

## 2025-07-09 DIAGNOSIS — R11.2 NAUSEA VOMITING AND DIARRHEA: Primary | ICD-10-CM

## 2025-07-09 DIAGNOSIS — R19.7 NAUSEA VOMITING AND DIARRHEA: Primary | ICD-10-CM

## 2025-07-09 LAB
ALBUMIN SERPL-MCNC: 5.3 G/DL (ref 3.2–4.8)
ALBUMIN/GLOB SERPL: 1.8 {RATIO} (ref 1–2)
ALP LIVER SERPL-CCNC: 75 U/L (ref 37–98)
ALT SERPL-CCNC: 78 U/L (ref 10–49)
ANION GAP SERPL CALC-SCNC: 13 MMOL/L (ref 0–18)
AST SERPL-CCNC: 43 U/L (ref ?–34)
BASOPHILS # BLD AUTO: 0.03 X10(3) UL (ref 0–0.2)
BASOPHILS NFR BLD AUTO: 0.2 %
BILIRUB SERPL-MCNC: 0.4 MG/DL (ref 0.3–1.2)
BUN BLD-MCNC: 14 MG/DL (ref 9–23)
BUN/CREAT SERPL: 20.6 (ref 10–20)
C DIFF TOX B STL QL: NEGATIVE
CALCIUM BLD-MCNC: 9.3 MG/DL (ref 8.7–10.4)
CHLORIDE SERPL-SCNC: 105 MMOL/L (ref 98–112)
CO2 SERPL-SCNC: 20 MMOL/L (ref 21–32)
CREAT BLD-MCNC: 0.68 MG/DL (ref 0.55–1.02)
DEPRECATED RDW RBC AUTO: 41.1 FL (ref 35.1–46.3)
EGFRCR SERPLBLD CKD-EPI 2021: 121 ML/MIN/1.73M2 (ref 60–?)
EOSINOPHIL # BLD AUTO: 0.17 X10(3) UL (ref 0–0.7)
EOSINOPHIL NFR BLD AUTO: 1.3 %
ERYTHROCYTE [DISTWIDTH] IN BLOOD BY AUTOMATED COUNT: 12.9 % (ref 11–15)
GLOBULIN PLAS-MCNC: 3 G/DL (ref 2–3.5)
GLUCOSE BLD-MCNC: 105 MG/DL (ref 70–99)
HCT VFR BLD AUTO: 42.5 % (ref 35–48)
HGB BLD-MCNC: 14.2 G/DL (ref 12–16)
IMM GRANULOCYTES # BLD AUTO: 0.04 X10(3) UL (ref 0–1)
IMM GRANULOCYTES NFR BLD: 0.3 %
LYMPHOCYTES # BLD AUTO: 3.16 X10(3) UL (ref 1–4)
LYMPHOCYTES NFR BLD AUTO: 25 %
MCH RBC QN AUTO: 29.2 PG (ref 26–34)
MCHC RBC AUTO-ENTMCNC: 33.4 G/DL (ref 31–37)
MCV RBC AUTO: 87.3 FL (ref 80–100)
MONOCYTES # BLD AUTO: 1.06 X10(3) UL (ref 0.1–1)
MONOCYTES NFR BLD AUTO: 8.4 %
NEUTROPHILS # BLD AUTO: 8.2 X10 (3) UL (ref 1.5–7.7)
NEUTROPHILS # BLD AUTO: 8.2 X10(3) UL (ref 1.5–7.7)
NEUTROPHILS NFR BLD AUTO: 64.8 %
OSMOLALITY SERPL CALC.SUM OF ELEC: 287 MOSM/KG (ref 275–295)
PLATELET # BLD AUTO: 468 10(3)UL (ref 150–450)
POTASSIUM SERPL-SCNC: 3.2 MMOL/L (ref 3.5–5.1)
PROT SERPL-MCNC: 8.3 G/DL (ref 5.7–8.2)
RBC # BLD AUTO: 4.87 X10(6)UL (ref 3.8–5.3)
SODIUM SERPL-SCNC: 138 MMOL/L (ref 136–145)
WBC # BLD AUTO: 12.7 X10(3) UL (ref 4–11)

## 2025-07-09 PROCEDURE — 96372 THER/PROPH/DIAG INJ SC/IM: CPT

## 2025-07-09 PROCEDURE — 85025 COMPLETE CBC W/AUTO DIFF WBC: CPT

## 2025-07-09 PROCEDURE — 85025 COMPLETE CBC W/AUTO DIFF WBC: CPT | Performed by: STUDENT IN AN ORGANIZED HEALTH CARE EDUCATION/TRAINING PROGRAM

## 2025-07-09 PROCEDURE — 80053 COMPREHEN METABOLIC PANEL: CPT | Performed by: STUDENT IN AN ORGANIZED HEALTH CARE EDUCATION/TRAINING PROGRAM

## 2025-07-09 PROCEDURE — 87493 C DIFF AMPLIFIED PROBE: CPT | Performed by: STUDENT IN AN ORGANIZED HEALTH CARE EDUCATION/TRAINING PROGRAM

## 2025-07-09 PROCEDURE — 99284 EMERGENCY DEPT VISIT MOD MDM: CPT

## 2025-07-09 PROCEDURE — 80053 COMPREHEN METABOLIC PANEL: CPT

## 2025-07-09 PROCEDURE — 96374 THER/PROPH/DIAG INJ IV PUSH: CPT

## 2025-07-09 PROCEDURE — 87507 IADNA-DNA/RNA PROBE TQ 12-25: CPT | Performed by: STUDENT IN AN ORGANIZED HEALTH CARE EDUCATION/TRAINING PROGRAM

## 2025-07-09 PROCEDURE — 96361 HYDRATE IV INFUSION ADD-ON: CPT

## 2025-07-09 RX ORDER — DICYCLOMINE HYDROCHLORIDE 10 MG/ML
20 INJECTION INTRAMUSCULAR ONCE
Status: COMPLETED | OUTPATIENT
Start: 2025-07-09 | End: 2025-07-09

## 2025-07-09 RX ORDER — ONDANSETRON 2 MG/ML
4 INJECTION INTRAMUSCULAR; INTRAVENOUS ONCE
Status: COMPLETED | OUTPATIENT
Start: 2025-07-09 | End: 2025-07-09

## 2025-07-09 RX ORDER — DICYCLOMINE HCL 20 MG
20 TABLET ORAL 4 TIMES DAILY PRN
Qty: 30 TABLET | Refills: 0 | Status: SHIPPED | OUTPATIENT
Start: 2025-07-09 | End: 2025-08-08

## 2025-07-09 RX ORDER — POTASSIUM CHLORIDE 1500 MG/1
40 TABLET, EXTENDED RELEASE ORAL ONCE
Status: COMPLETED | OUTPATIENT
Start: 2025-07-09 | End: 2025-07-09

## 2025-07-09 RX ORDER — ONDANSETRON 4 MG/1
4 TABLET, ORALLY DISINTEGRATING ORAL EVERY 4 HOURS PRN
Qty: 10 TABLET | Refills: 0 | Status: SHIPPED | OUTPATIENT
Start: 2025-07-09 | End: 2025-07-16

## 2025-07-09 NOTE — ED PROVIDER NOTES
Patient Seen in: [facility] Emergency Department        History  Chief Complaint   Patient presents with    Nausea/Vomiting/Diarrhea     Stated Complaint: N/V/D    Subjective:   HPI            This 30-year-old female, otherwise healthy presenting for evaluation of vomiting and diarrhea.  Onset of symptoms 4 days ago.  Describes multiple daily episodes nonbloody nonbilious emesis watery nonbloody diarrhea.  Associated with low-grade fevers which is been taking Tylenol.  States that 2 of her family members are subsequently become ill with similar symptoms.  She denies recent travel recent antibiotic exposures.  Denies bloody diarrhea or emesis.  Felt lightheaded when standing today which finally prompted presentation to the ER.      Objective:     Past Medical History:    Diabetes (HCC)              History reviewed. No pertinent surgical history.             Social History     Socioeconomic History    Marital status:    Tobacco Use    Smoking status: Former    Smokeless tobacco: Never   Substance and Sexual Activity    Alcohol use: Yes    Drug use: No     Social Drivers of Health      Received from [facility] [address]                                Physical Exam    ED Triage Vitals [07/09/25 0248]   /74   Pulse 118   Resp 18   Temp 98.2 °F (36.8 °C)   Temp src    SpO2 97 %   O2 Device None (Room air)       Current Vitals:   Vital Signs  BP: 110/74  Pulse: 89  Resp: 19  Temp: 98.2 °F (36.8 °C)  MAP (mmHg): 84    Oxygen Therapy  SpO2: 99 %  O2 Device: None (Room air)            Physical Exam  Constitutional: awake, alert, no sig distress  HENT: mmm, no lesions,  Neck: normal range of motion, no tenderness, supple.  Eyes: PERRL, EOMI, conjunctiva normal, no discharge. Sclera anicteric.  Cardiovascular: rr no murmur  Respiratory: Normal breath sounds, no respiratory distress, no wheezing, no chest tenderness.  GI: Bowel sounds normal, Soft, no tenderness, no masses, no pulsatile masses.  :  No CVA tenderness.  Skin: Warm, dry, no erythema, no rash.  Musculoskeletal: Intact distal pulses, no edema, no tenderness, no cyanosis, no clubbing. Good range of motion in all major joints. No tenderness to palpation or major deformities noted. Back- No tenderness.  Neurologic: Alert & oriented x 3, normal motor function, normal sensory function, no focal deficits noted.  Psych: Calm, cooperative, nl affect            ED Course  Labs Reviewed   COMP METABOLIC PANEL (14) - Abnormal; Notable for the following components:       Result Value    Glucose 105 (*)     Potassium 3.2 (*)     CO2 20.0 (*)     BUN/CREA Ratio 20.6 (*)     ALT 78 (*)     AST 43 (*)     Total Protein 8.3 (*)     Albumin 5.3 (*)     All other components within normal limits   CBC WITH DIFFERENTIAL WITH PLATELET - Abnormal; Notable for the following components:    WBC 12.7 (*)     .0 (*)     Neutrophil Absolute Prelim 8.20 (*)     Neutrophil Absolute 8.20 (*)     Monocyte Absolute 1.06 (*)     All other components within normal limits   C. DIFFICILE(TOXIGENIC)PCR - Normal   GI STOOL PANEL BY PCR                            MDM     30F hx as above who presents with vomiting/diarrhea  On arrival vs remarkable for tachycardia  Ddx: viral gastroenteritis, enterocolitis, dehydration, metabolic derangement  -abd soft, nontender  Plan: metabolic labs, stool studies if able, IVF bolus, zofran/bentyl.     Labs reviewed, mild leukocytosis, Bicarb 20, Mild HypoK which was repleted. On re-evaluation pt improved after fliuds, meds. Tolerating PO. Tachhycardia resolved. Stool studies sent. Discussed return precautions and follow up instructions with patient who voiced understanding and agreement with the plan. All questions answered.         Medical Decision Making      Disposition and Plan     Clinical Impression:  1. Nausea vomiting and diarrhea         Disposition:  Discharge  7/9/2025  4:50 am    Follow-up:  [facility] Emergency Department  [address]  [place] [address]  [phone]  Follow up  As needed, If symptoms worsen    Carl Turcios MD  [address] [facility]  [address]  [facility] [address]  [phone]    Call            Medications Prescribed:  Discharge Medication List as of 7/9/2025  4:55 AM        START taking these medications    Details   dicyclomine 20 MG Oral Tab Take 1 tablet (20 mg total) by mouth 4 (four) times daily as needed., Normal, Disp-30 tablet, R-0      ondansetron 4 MG Oral Tablet Dispersible Take 1 tablet (4 mg total) by mouth every 4 (four) hours as needed for Nausea., Normal, Disp-10 tablet, R-0                   Supplementary Documentation:

## 2025-07-09 NOTE — ED QUICK NOTES
Assumed care of patient @ this time - patient arrives to ED with main c.o. N/V/D since Friday - patient states she has a hard time keeping food down and feels like she is dehydrated - patient confirms mild abdominal cramping - confirms sick contact. States her son is home with a fever - patient denies fevers and chills. Patient breathing non-labored on RA + pt hemodynamically stable.

## 2025-07-09 NOTE — ED INITIAL ASSESSMENT (HPI)
30y F to ED via personal car with c/o nausea, vomiting, and diarrhea since Friday. Patient says she is unable to keep anything down and she is beginning to feel dehydrated. Mild generalized abdominal pain. Son has fever.

## (undated) NOTE — LETTER
[facility] [address] [address]  [phone]     Patient: Stan Yeager   YOB: 1995   Date of Visit: 1/26/2024     Dear Employer,        January 26, 2024    At [facility], we are taking special precautions and doing everything we can to prevent the spread of COVID-19. During this time, we ask for your assistance regarding physician documentation for employees to return to work following a respiratory illness.     The Centers for Disease Control (CDC) recommends the following:    Ensure that sick leave policies are flexible and consistent with public health guidance, and employees are aware of and understand these policies.   Maintain flexible policies that permit employees to stay home to care for a sick family member or to take care of children due to school and  closures.   Employers should not require a COVID-19 test result or a healthcare provider’s note for sick employees to validate their illness, qualify for sick leave or return to work.   Be aware that healthcare provider offices and medical facilities may be extremely busy and not able to provide documentation in a timely manner. Most people with COVID-19 have mild illness, can recover at home without medical care and can follow CDC recommendations to determine when to discontinue home isolation and return to work.      Individuals with COVID-19 symptoms directed to care for themselves at home should:  Isolate for five days. If individuals are asymptomatic or symptoms are resolving (without fever for 24 hours), isolation may be discontinued on day six. Onset of symptoms is considered day zero.   Follow isolation by five days of mask wearing when around others to minimize the risk of infection.     Individuals infected with SARS-CoV-2 who never develop COVID-19 symptoms:    Day of positive test is considered day zero.   Isolate for five days.  Can discontinue isolation on day six.   Follow  isolation by five days of wearing a mask when around others to minimize the risk of infection.    Individuals who are asymptomatic but have been exposed:  For people who are unvaccinated or are more than six months out from their second mRNA dose (or more than two months after the J&J vaccine) and not yet boosted, CDC now recommends quarantine for five days followed by strict, mask use for an additional five days. Alternatively, if a five-day quarantine is not feasible, it is imperative that an exposed person wear a well-fitting mask at all times when around others for 10 days after exposure.   Individuals who have received their booster shot do not need to quarantine following an exposure but should wear a mask for 10 days after the exposure.    Best practice would also include a test on day five after exposure.   If symptoms occur, individuals should immediately quarantine until a negative test confirms symptoms are not attributable to COVID-19.     Please visit the CDC website for further information and details to assist you during this challenging time.     Sincerely,     RICHARD Key

## (undated) NOTE — LETTER
Date & Time: 2/22/2024, 1:59 PM  Patient: Stan Yeager  Encounter Provider(s):    Steven Martinez MD       To Whom It May Concern:    Stan Yeager was seen and treated in our department on 2/22/2024. She should not return to work until 2/26/2024 .    If you have any questions or concerns, please do not hesitate to call.        _____________________________  Physician/APC Signature

## (undated) NOTE — LETTER
Date & Time: 1/26/2024, 8:16 AM  Patient: Stan Yeager  Encounter Provider(s):    Radha Villalta APRN       To Whom It May Concern:    Stan Yeager was seen and treated in our department on 1/26/2024. She can return to work 01/29/2024.    If you have any questions or concerns, please do not hesitate to call.    RICHARD Key    _____________________________  Physician/APC Signature